# Patient Record
Sex: MALE | Race: WHITE | NOT HISPANIC OR LATINO | Employment: OTHER | URBAN - METROPOLITAN AREA
[De-identification: names, ages, dates, MRNs, and addresses within clinical notes are randomized per-mention and may not be internally consistent; named-entity substitution may affect disease eponyms.]

---

## 2017-01-03 LAB — INR PPP: 1.2 (ref 2–3.5)

## 2017-01-04 ENCOUNTER — ANTICOAGULATION MONITORING (OUTPATIENT)
Dept: VASCULAR LAB | Facility: MEDICAL CENTER | Age: 67
End: 2017-01-04

## 2017-01-04 DIAGNOSIS — I48.91 ATRIAL FIBRILLATION, UNSPECIFIED TYPE (HCC): ICD-10-CM

## 2017-01-04 RX ORDER — WARFARIN SODIUM 5 MG/1
TABLET ORAL
Qty: 30 TAB | Refills: 0 | OUTPATIENT
Start: 2017-01-04

## 2017-01-04 NOTE — PROGRESS NOTES
Anticoagulation Summary as of 1/4/2017     INR goal 2.0-3.0   Selected INR 1.2! (1/3/2017)   Maintenance plan 5 mg (5 mg x 1) every day   Weekly total 35 mg   Plan last modified RON OrrD (8/11/2015)   Next INR check    Target end date     Indications   Atrial fibrillation (HCC) [I48.91]         Anticoagulation Episode Summary     INR check location Home Draw    Preferred lab     Send INR reminders to     Comments [facility]       Anticoagulation Care Providers     Provider Role Specialty Phone number    Rosalio Lopez M.D. Referring Cardiology [phone]    Ananda Pollock, PHARMD Responsible          Anticoagulation Patient Findings    Left voicemail message to report a subtherapeutic INR of 1.2.  Requested pt contact the clinic for any s/s of unusual bleeding, bruising, clotting or any changes to diet or medication.   According to chart, patient had total knee arthroplasty 12-28-16.  He was restarted on warfarin same day at previous dose.  Instructed him to bolus with 10mg X 2, then resume current warfarin regimen.  He is to also continue lovenox until INR >2.0.  Asked that patient please call clinic to verify the above information.  Follow up three days from previous INR.  Adriel Jennings, ROND

## 2017-01-10 LAB — INR PPP: 1.9 (ref 2–3.5)

## 2017-01-11 ENCOUNTER — ANTICOAGULATION MONITORING (OUTPATIENT)
Dept: VASCULAR LAB | Facility: MEDICAL CENTER | Age: 67
End: 2017-01-11

## 2017-01-11 DIAGNOSIS — I48.91 ATRIAL FIBRILLATION, UNSPECIFIED TYPE (HCC): ICD-10-CM

## 2017-01-11 NOTE — PROGRESS NOTES
Anticoagulation Summary as of 1/11/2017     INR goal 2.0-3.0   Selected INR 1.9! (1/10/2017)   Maintenance plan 5 mg (5 mg x 1) every day   Weekly total 35 mg   Plan last modified RON OrrD (8/11/2015)   Next INR check 1/17/2017   Target end date     Indications   Atrial fibrillation (HCC) [I48.91]         Anticoagulation Episode Summary     INR check location Home Draw    Preferred lab     Send INR reminders to     Comments [facility]       Anticoagulation Care Providers     Provider Role Specialty Phone number    Rosalio Lopez M.D. Referring Cardiology [phone]    Ananda Pollock, PHARMD Responsible          Anticoagulation Patient Findings    Spoke to patient on phone. He ran out of enoxaparin a few days ago. Given his CHADS risk factors and current INR value will not reorder enoxaparin. Increase warfarin to 10 mg tonight then resume 5 mg daily. Follow up INR in one week.    Ananda Pollock, PHARMD

## 2017-01-26 ENCOUNTER — TELEPHONE (OUTPATIENT)
Dept: VASCULAR LAB | Facility: MEDICAL CENTER | Age: 67
End: 2017-01-26

## 2017-01-26 NOTE — TELEPHONE ENCOUNTER
1/26/2017    Spoke to patient on phone. States he will have INR drawn today or tomorrow.    Ananda Pollock, PHARMD

## 2017-02-15 ENCOUNTER — TELEPHONE (OUTPATIENT)
Dept: VASCULAR LAB | Facility: MEDICAL CENTER | Age: 67
End: 2017-02-15

## 2017-02-17 DIAGNOSIS — I48.91 ATRIAL FIBRILLATION, UNSPECIFIED TYPE (HCC): ICD-10-CM

## 2017-02-17 RX ORDER — WARFARIN SODIUM 5 MG/1
TABLET ORAL
Qty: 30 TAB | Refills: 0 | Status: SHIPPED | OUTPATIENT
Start: 2017-02-17 | End: 2017-03-21 | Stop reason: SDUPTHER

## 2017-02-21 ENCOUNTER — ANTICOAGULATION MONITORING (OUTPATIENT)
Dept: VASCULAR LAB | Facility: MEDICAL CENTER | Age: 67
End: 2017-02-21

## 2017-02-21 LAB — INR PPP: 4.1 (ref 2–3.5)

## 2017-02-21 NOTE — PROGRESS NOTES
Anticoagulation Summary as of 2/21/2017     INR goal 2.0-3.0   Selected INR 4.1! (2/17/2017)   Maintenance plan 5 mg (5 mg x 1) every day   Weekly total 35 mg   Plan last modified RON OrrD (8/11/2015)   Next INR check 2/24/2017   Target end date     Indications   Atrial fibrillation (CMS-HCC) [I48.91]         Anticoagulation Episode Summary     INR check location Home Draw    Preferred lab     Send INR reminders to     Comments [facility]       Anticoagulation Care Providers     Provider Role Specialty Phone number    Rosalio Lopez M.D. Referring Cardiology [phone]    Ananda Pollock, PHARMD Responsible          Anticoagulation Patient Findings    Patient's INR was SUPRA therapeutic.   Denies any unusual s/s of bleeding, bruising, clotting.  Denies any changes to:   Diet   Medications  Confirmed dosing regimen. Pt already held doses over the weekend.  Pt is to continue with current warfarin dosing regimen.    Follow up in 4 days.    Srikanth Olivera, PHARMD

## 2017-03-10 DIAGNOSIS — I48.91 ATRIAL FIBRILLATION, UNSPECIFIED TYPE (HCC): ICD-10-CM

## 2017-04-03 ENCOUNTER — TELEPHONE (OUTPATIENT)
Dept: VASCULAR LAB | Facility: MEDICAL CENTER | Age: 67
End: 2017-04-03

## 2017-04-18 ENCOUNTER — TELEPHONE (OUTPATIENT)
Dept: VASCULAR LAB | Facility: MEDICAL CENTER | Age: 67
End: 2017-04-18

## 2017-04-19 LAB — INR PPP: 3.2 (ref 2–3.5)

## 2017-04-20 ENCOUNTER — ANTICOAGULATION MONITORING (OUTPATIENT)
Dept: VASCULAR LAB | Facility: MEDICAL CENTER | Age: 67
End: 2017-04-20

## 2017-04-20 DIAGNOSIS — I48.91 ATRIAL FIBRILLATION, UNSPECIFIED TYPE (HCC): ICD-10-CM

## 2017-04-20 NOTE — PROGRESS NOTES
Anticoagulation Summary as of 4/20/2017     INR goal 2.0-3.0   Selected INR 3.2! (4/19/2017)   Maintenance plan 5 mg (5 mg x 1) every day   Weekly total 35 mg   Plan last modified RON OrrD (8/11/2015)   Next INR check 5/10/2017   Target end date     Indications   Atrial fibrillation (CMS-HCC) [I48.91]         Anticoagulation Episode Summary     INR check location Home Draw    Preferred lab     Send INR reminders to     Comments [facility]       Anticoagulation Care Providers     Provider Role Specialty Phone number    Rosalio Lopez M.D. Referring Cardiology [phone]    Ananda Pollock, PHARMD Responsible          Anticoagulation Patient Findings    Spoke to patient on phone. No current signs of bleeding. No interval medication changes. Decrease dose of warfarin to 2.5 mg today only. Follow up INR in 3 weeks.    Ananda Pollock, PHARMD

## 2017-05-24 ENCOUNTER — TELEPHONE (OUTPATIENT)
Dept: VASCULAR LAB | Facility: MEDICAL CENTER | Age: 67
End: 2017-05-24

## 2017-06-28 ENCOUNTER — TELEPHONE (OUTPATIENT)
Dept: VASCULAR LAB | Facility: MEDICAL CENTER | Age: 67
End: 2017-06-28

## 2017-07-12 ENCOUNTER — TELEPHONE (OUTPATIENT)
Dept: VASCULAR LAB | Facility: MEDICAL CENTER | Age: 67
End: 2017-07-12

## 2017-07-20 DIAGNOSIS — E78.5 HYPERLIPIDEMIA, UNSPECIFIED HYPERLIPIDEMIA TYPE: ICD-10-CM

## 2017-07-21 RX ORDER — ATORVASTATIN CALCIUM 20 MG/1
20 TABLET, FILM COATED ORAL EVERY EVENING
Qty: 90 TAB | Refills: 0 | OUTPATIENT
Start: 2017-07-21

## 2017-08-11 ENCOUNTER — TELEPHONE (OUTPATIENT)
Dept: VASCULAR LAB | Facility: MEDICAL CENTER | Age: 67
End: 2017-08-11

## 2018-01-09 ENCOUNTER — ANTICOAGULATION MONITORING (OUTPATIENT)
Dept: VASCULAR LAB | Facility: MEDICAL CENTER | Age: 68
End: 2018-01-09

## 2018-01-09 DIAGNOSIS — I48.91 ATRIAL FIBRILLATION, UNSPECIFIED TYPE (HCC): ICD-10-CM

## 2018-01-09 NOTE — PROGRESS NOTES
Anticoagulation Summary  As of 1/9/2018    INR goal:   2.0-3.0   TTR:   50.3 % (1.7 y)   Today's INR:   No new INR was available at the time of this encounter.   Maintenance plan:   5 mg (5 mg x 1) every day   Weekly total:   35 mg   Plan last modified:   Viktoriya Monteiro PharmD (8/11/2015)   Next INR check:      Target end date:       Indications    Atrial fibrillation (CMS-HCC) [I48.91]             Anticoagulation Episode Summary     INR check location:   Home Draw    Preferred lab:       Resolved date:   1/9/2018    Resolved reason:   Other    Send INR reminders to:       Comments:   [facility]       Anticoagulation Care Providers     Provider Role Specialty Phone number    Rosalio Lopez M.D. Referring Cardiology [phone]    Ananda Pollock, PharmD Responsible          Anticoagulation Patient Findings    Discharged from Anticoagulation Program due to non-compliance with INR follow up.    Ananda Pollock, PharmD

## 2021-01-15 DIAGNOSIS — Z23 NEED FOR VACCINATION: ICD-10-CM
